# Patient Record
Sex: MALE | Race: WHITE | NOT HISPANIC OR LATINO | Employment: FULL TIME | URBAN - METROPOLITAN AREA
[De-identification: names, ages, dates, MRNs, and addresses within clinical notes are randomized per-mention and may not be internally consistent; named-entity substitution may affect disease eponyms.]

---

## 2018-05-11 ENCOUNTER — OFFICE VISIT (OUTPATIENT)
Dept: MEDICAL GROUP | Facility: MEDICAL CENTER | Age: 33
End: 2018-05-11
Payer: COMMERCIAL

## 2018-05-11 ENCOUNTER — TELEPHONE (OUTPATIENT)
Dept: MEDICAL GROUP | Facility: MEDICAL CENTER | Age: 33
End: 2018-05-11

## 2018-05-11 ENCOUNTER — HOSPITAL ENCOUNTER (OUTPATIENT)
Facility: MEDICAL CENTER | Age: 33
End: 2018-05-11
Attending: FAMILY MEDICINE
Payer: COMMERCIAL

## 2018-05-11 VITALS
BODY MASS INDEX: 26.43 KG/M2 | HEART RATE: 80 BPM | OXYGEN SATURATION: 97 % | TEMPERATURE: 98.1 F | RESPIRATION RATE: 14 BRPM | SYSTOLIC BLOOD PRESSURE: 120 MMHG | HEIGHT: 71 IN | DIASTOLIC BLOOD PRESSURE: 76 MMHG | WEIGHT: 188.8 LBS

## 2018-05-11 DIAGNOSIS — K13.70 ORAL LESION: ICD-10-CM

## 2018-05-11 DIAGNOSIS — Z00.00 HEALTHCARE MAINTENANCE: ICD-10-CM

## 2018-05-11 DIAGNOSIS — Z13.1 SCREENING FOR DIABETES MELLITUS: ICD-10-CM

## 2018-05-11 DIAGNOSIS — Z13.6 SCREENING FOR ISCHEMIC HEART DISEASE: ICD-10-CM

## 2018-05-11 DIAGNOSIS — N50.819 TESTICULAR PAIN: ICD-10-CM

## 2018-05-11 DIAGNOSIS — N50.89 SCROTAL SWELLING: ICD-10-CM

## 2018-05-11 DIAGNOSIS — F43.9 SITUATIONAL STRESS: ICD-10-CM

## 2018-05-11 PROBLEM — N50.9 SCROTAL LESION: Status: RESOLVED | Noted: 2018-05-11 | Resolved: 2018-05-11

## 2018-05-11 PROBLEM — N50.9 SCROTAL LESION: Status: ACTIVE | Noted: 2018-05-11

## 2018-05-11 LAB
APPEARANCE UR: CLEAR
C TRACH DNA SPEC QL NAA+PROBE: NEGATIVE
COLOR UR: NORMAL
GLUCOSE UR STRIP.AUTO-MCNC: NEGATIVE MG/DL
KETONES UR STRIP.AUTO-MCNC: NEGATIVE MG/DL
LEUKOCYTE ESTERASE UR QL STRIP.AUTO: NEGATIVE
MICRO URNS: NORMAL
N GONORRHOEA DNA SPEC QL NAA+PROBE: NEGATIVE
NITRITE UR QL STRIP.AUTO: NEGATIVE
PH UR STRIP.AUTO: 8 [PH]
PROT UR QL STRIP: NEGATIVE MG/DL
RBC UR QL AUTO: NEGATIVE
SP GR UR STRIP.AUTO: 1
SPECIMEN SOURCE: NORMAL

## 2018-05-11 PROCEDURE — 81003 URINALYSIS AUTO W/O SCOPE: CPT

## 2018-05-11 PROCEDURE — 99203 OFFICE O/P NEW LOW 30 MIN: CPT | Performed by: FAMILY MEDICINE

## 2018-05-11 PROCEDURE — 87491 CHLMYD TRACH DNA AMP PROBE: CPT

## 2018-05-11 PROCEDURE — 87591 N.GONORRHOEAE DNA AMP PROB: CPT

## 2018-05-11 ASSESSMENT — PATIENT HEALTH QUESTIONNAIRE - PHQ9: CLINICAL INTERPRETATION OF PHQ2 SCORE: 0

## 2018-05-11 NOTE — TELEPHONE ENCOUNTER
We received labs from [facility] today. The patient also had a scrotal ultrasound. Can we request this specifically?

## 2018-05-11 NOTE — ASSESSMENT & PLAN NOTE
"On 3/3 patient developed some left lower quadrant abdominal pain and bloating. He was prescribed a course of Cipro and Flagyl. This resolved the symptoms, however, sometime around this period he developed intense bilateral testicular pain with a sense that his testicles are moving proximally. He was continued on a few more days of cipro and ultimately had a testicular ultrasound at [facility], [place] which is reportedly normal. Today, the patient states sometimes he feels as if there is some scrotal swelling described as \"tightness\" and prominence of a left scrotal vein. He denies penile discharge, dysuria, urinary frequency and urinary urgency. He does note some possible inguinal discomfort.  "

## 2018-05-11 NOTE — ASSESSMENT & PLAN NOTE
On 4/22 the patient went to his dentist. His hygenist identified some bilateral oral lesions. These were evaluated by his dentist who was not too concerned. The patient wonders if he has some submandibular swelling. He denies sore throat, chest pain, shortness of breath, cough. He does notice some possible mucous production, however.

## 2018-05-11 NOTE — PROGRESS NOTES
"[facility]  Progress Note  New Patient    Subjective:   Fawad Cesar is a 32 y.o. male here today with a chief complaint of oral lesions. This is a new patient to me. The patient comes in alone.     Oral lesion  On 4/22 the patient went to his dentist. His hygenist identified some bilateral oral lesions. These were evaluated by his dentist who was not too concerned. The patient wonders if he has some submandibular swelling. He denies sore throat, chest pain, shortness of breath, cough. He does notice some possible mucous production, however.    Situational stress  The patient is currently going through a divorce. This puts stress on him. He is not currently interested in seeing a counselor.    Scrotal swelling  On 3/3 patient developed some left lower quadrant abdominal pain and bloating. He was prescribed a course of Cipro and Flagyl. This resolved the symptoms, however, sometime around this period he developed intense bilateral testicular pain with a sense that his testicles are moving proximally. He was continued on a few more days of cipro and ultimately had a testicular ultrasound at [facility] which is reportedly normal. Today, the patient states sometimes he feels as if there is some scrotal swelling described as \"tightness\" and prominence of a left scrotal vein. He denies penile discharge, dysuria, urinary frequency and urinary urgency. He does note some possible inguinal discomfort.    Healthcare maintenance  Lipids: ordered.  Fasting Glucose: ordered.      Tdap: given 2009.       No current outpatient prescriptions on file prior to visit.     No current facility-administered medications on file prior to visit.        History reviewed. No pertinent past medical history.    Allergies: Patient has no known allergies.    Surgical History:  has a past surgical history that includes tonsillectomy.    Family History: family history includes Alcohol/Drug in his father; Heart Disease in his father; " "Hypertension in his father.    Social History:  reports that he has been smoking Cigars.  He has never used smokeless tobacco. He reports that he drinks alcohol.    ROS:   See HPI.       Objective:     Vitals:    05/11/18 0824   BP: 120/76   Pulse: 80   Resp: 14   Temp: 36.7 °C (98.1 °F)   SpO2: 97%   Weight: 85.6 kg (188 lb 12.8 oz)   Height: 1.803 m (5' 11\")       Physical Exam:  Constitutional: Alert, no distress.  Skin: Warm, dry, good turgor, no rashes in visible areas.  Eye: Equal, conjunctiva clear, lids normal.  ENMT: Lips without lesions, good dentition, oropharynx clear without redness. Uvula midline. There is some flesh-colored hyperplastic tissue located bilaterally between the palatoglossal and palatopharyngeal arch. The right-sided tissue measures approximately 1 cm in diameter. The left-sided tissue measures approximately 0.75 cm in diameter.  Neck: Trachea midline, no masses, no thyromegaly. No cervical or supraclavicular lymphadenopathy. Normal submandibular glands.  Respiratory: Unlabored respiratory effort, lungs clear to auscultation, no wheezes, no ronchi.  Cardiovascular: Normal S1, S2, no murmur, no edema.  Abdomen: Soft, non-tender, no masses, no hepatosplenomegaly.   : patient declines.  Psych: Alert and oriented.      Assessment and Plan:     1. Oral lesion  The area in question is most consistent with residual tonsillar tissue with likely lymphoid hyperplasia. I see no indication for throat culture. Differential diagnosis includes pemphigus vulgaris (unlikely because there is no pain), lichen planus and cancer. I'm less inclined to think this is cancer because of the bilateral nature and low risk patient. Because the patient is concerned, I did offer an ENT referral for possible biopsy but he declined and would like to watch it which I think is reasonable.  - continue to monitor.     2. Scrotal swelling  Patient declines examination. We'll proceed with workup below and request records " from [facility] for Scrotal ultrasound. I am reassured at the reportedly normal scrotal ultrasound.  - URINALYSIS,CULTURE IF INDICATED; Future  - CHLAMYDIA/GC PCR URINE OR SWAB; Future    3. Situational stress  Offered counseling, patient declines.  - Recommended exercises and speaking with supportive persons.    4. Healthcare maintenance  - see HPI.    5. Screening for ischemic heart disease  - LIPID PROFILE; Future    6. Screening for diabetes mellitus  - BLOOD GLUCOSE; Future    Total 35 minutes face-to-face time spent with patient, with greater than 50% of the total time discussing patient's issues and symptoms as listed above in assessment and plan, as well as managing coordination of care for future evaluation and treatment.    Followup: Return in about 2 weeks (around 5/25/2018), or if symptoms worsen or fail to improve.

## 2018-05-11 NOTE — ASSESSMENT & PLAN NOTE
The patient is currently going through a divorce. This puts stress on him. He is not currently interested in seeing a counselor.

## 2018-05-14 ENCOUNTER — TELEPHONE (OUTPATIENT)
Dept: MEDICAL GROUP | Facility: MEDICAL CENTER | Age: 33
End: 2018-05-14

## 2018-05-14 NOTE — TELEPHONE ENCOUNTER
----- Message from Casper Ackerman M.D. sent at 5/14/2018  7:56 AM PDT -----  Please call and inform this patient of the following:  All of his urine testing is normal.

## 2018-05-29 ENCOUNTER — APPOINTMENT (OUTPATIENT)
Dept: LAB | Facility: MEDICAL CENTER | Age: 33
End: 2018-05-29
Payer: COMMERCIAL

## 2018-05-30 ENCOUNTER — HOSPITAL ENCOUNTER (OUTPATIENT)
Dept: LAB | Facility: MEDICAL CENTER | Age: 33
End: 2018-05-30
Attending: FAMILY MEDICINE
Payer: COMMERCIAL

## 2018-05-30 DIAGNOSIS — Z13.1 SCREENING FOR DIABETES MELLITUS: ICD-10-CM

## 2018-05-30 DIAGNOSIS — Z13.6 SCREENING FOR ISCHEMIC HEART DISEASE: ICD-10-CM

## 2018-05-30 PROCEDURE — 82947 ASSAY GLUCOSE BLOOD QUANT: CPT

## 2018-05-30 PROCEDURE — 80061 LIPID PANEL: CPT

## 2018-05-30 PROCEDURE — 36415 COLL VENOUS BLD VENIPUNCTURE: CPT

## 2018-05-31 ENCOUNTER — OFFICE VISIT (OUTPATIENT)
Dept: MEDICAL GROUP | Facility: MEDICAL CENTER | Age: 33
End: 2018-05-31
Payer: COMMERCIAL

## 2018-05-31 VITALS
HEIGHT: 71 IN | HEART RATE: 74 BPM | WEIGHT: 188 LBS | RESPIRATION RATE: 16 BRPM | BODY MASS INDEX: 26.32 KG/M2 | SYSTOLIC BLOOD PRESSURE: 100 MMHG | OXYGEN SATURATION: 94 % | TEMPERATURE: 98.5 F | DIASTOLIC BLOOD PRESSURE: 60 MMHG

## 2018-05-31 DIAGNOSIS — K60.2 ANAL FISSURE: ICD-10-CM

## 2018-05-31 DIAGNOSIS — N50.89 SCROTAL SWELLING: ICD-10-CM

## 2018-05-31 DIAGNOSIS — F43.9 SITUATIONAL STRESS: ICD-10-CM

## 2018-05-31 DIAGNOSIS — Z00.00 HEALTHCARE MAINTENANCE: ICD-10-CM

## 2018-05-31 DIAGNOSIS — K13.70 ORAL LESION: ICD-10-CM

## 2018-05-31 LAB
CHOLEST SERPL-MCNC: 123 MG/DL (ref 100–199)
GLUCOSE SERPL-MCNC: 80 MG/DL (ref 65–99)
HDLC SERPL-MCNC: 44 MG/DL
LDLC SERPL CALC-MCNC: 62 MG/DL
TRIGL SERPL-MCNC: 85 MG/DL (ref 0–149)

## 2018-05-31 PROCEDURE — 99214 OFFICE O/P EST MOD 30 MIN: CPT | Performed by: FAMILY MEDICINE

## 2018-05-31 RX ORDER — IBUPROFEN 200 MG
200 TABLET ORAL EVERY 6 HOURS PRN
COMMUNITY

## 2018-05-31 NOTE — PROGRESS NOTES
"[facility]  Progress Note  Established Patient    Subjective:   Fawad Cesar is a 32 y.o. male here today with a chief complaint of rectal bleeding.     Anal fissure  The patient has a history of hemorrhoids. One month ago, however, he noticed bleeding and pain with defecation for one week. This got better and then got worse again. He had a bowel movement this morning which was slightly painful. Overall he thinks he is slightly improving.    Oral lesion  At the last visit the patient described some oral lesions without sore throat, chest pain, shortness of breath, cough. He has monitored these and they have remained stable without any change. He is also followed up with his dentist.     Scrotal swelling  At the last visit the patient described some left lower quadrant abdominal pain and bloating responsive to Cipro and Flagyl with bilateral testicular pain with a sense that his testicles are moving proximally. He states that he had a normal testicular ultrasound at [facility]. At the last visit he also described some scrotal swelling described as \"tightness\" and prominence of a left scrotal vein without penile discharge, dysuria, urinary frequency and urinary urgency. A UA and GC/Chl test were obtained and were normal. Patient states that all of these symptoms have resolved.    Situational stress  The patient is currently going through a divorce. This puts stress on him. He continues to decline seeing a counselor or taking medication. He states he is overall doing better.    Healthcare maintenance  Lipids: done 2018 and normal.   Fasting Glucose: done 2018 and normal.     Tdap: done 2009.       No current outpatient prescriptions on file prior to visit.     No current facility-administered medications on file prior to visit.        History reviewed. No pertinent past medical history.    Allergies: Patient has no known allergies.    Surgical History:  has a past surgical history that " "includes tonsillectomy.    Family History: family history includes Alcohol/Drug in his father; Heart Disease in his father; Hypertension in his father.    Social History:  reports that he has been smoking Cigars.  He has never used smokeless tobacco. He reports that he drinks alcohol.    ROS: no fever. See HPI.        Objective:     Vitals:    05/31/18 1501   BP: 100/60   Pulse: 74   Resp: 16   Temp: 36.9 °C (98.5 °F)   SpO2: 94%   Weight: 85.3 kg (188 lb)   Height: 1.803 m (5' 11\")       Physical Exam:  General: alert in no apparent distress.   Rectal: at the posterior midline there is a clear anal fissure.   ENMT: There is some flesh-colored hyperplastic tissue located bilaterally between the palatoglossal and palatopharyngeal arch. The right-sided tissue measures approximately 1 cm in diameter. The left-sided tissue measures approximately 0.75 cm in diameter. No cervical or supraclavicular LAD.       Assessment and Plan:     1. Anal fissure  I informed patient I strongly recommend he increase his fluid and fiber intake.   - Recommended 2-3 L of water per day and 4 raw vegetables and 3 raw fruits per day.   - I offered him prescription medication including a nitroglycerin but he declines.  - metamucil.   - call if persistent or worsening.     2. Oral lesion  There have been no change in these lesions. Again, I think these are some residual tonsillar tissue with lymphoid hyperplasia; however, I did inform the patient that I cannot definitively rule out cancer without a biopsy. I offered to send to ENT to achieve this. He is not currently interested.  - patient will monitor and inform me if they change or increase in size.     3. Scrotal swelling  - resolved.     4. Situational stress  - declines medication or counseling.     5. Healthcare maintenance  - see HPI.         Followup: Return in about 6 months (around 11/30/2018), or if symptoms worsen or fail to improve, for Wellness Visit, Long.         "

## 2018-05-31 NOTE — ASSESSMENT & PLAN NOTE
"At the last visit the patient described some left lower quadrant abdominal pain and bloating responsive to Cipro and Flagyl with bilateral testicular pain with a sense that his testicles are moving proximally. He states that he had a normal testicular ultrasound at [facility]. At the last visit he also described some scrotal swelling described as \"tightness\" and prominence of a left scrotal vein without penile discharge, dysuria, urinary frequency and urinary urgency. A UA and GC/Chl test were obtained and were normal. Patient states that all of these symptoms have resolved.  "

## 2018-05-31 NOTE — ASSESSMENT & PLAN NOTE
At the last visit the patient described some oral lesions without sore throat, chest pain, shortness of breath, cough. He has monitored these and they have remained stable without any change. He is also followed up with his dentist.

## 2018-05-31 NOTE — ASSESSMENT & PLAN NOTE
The patient is currently going through a divorce. This puts stress on him. He continues to decline seeing a counselor or taking medication. He states he is overall doing better.

## 2018-05-31 NOTE — ASSESSMENT & PLAN NOTE
The patient has a history of hemorrhoids. One month ago, however, he noticed bleeding and pain with defecation for one week. This got better and then got worse again. He had a bowel movement this morning which was slightly painful. Overall he thinks he is slightly improving.

## 2018-06-28 ENCOUNTER — TELEPHONE (OUTPATIENT)
Dept: MEDICAL GROUP | Facility: MEDICAL CENTER | Age: 33
End: 2018-06-28

## 2018-06-28 DIAGNOSIS — J39.2 OROPHARYNGEAL LESION: ICD-10-CM

## 2018-06-29 NOTE — TELEPHONE ENCOUNTER
The patient contacted me. He believes the number of oropharyngeal lesions have increased and is interested in seeing an ENT. Consultation placed.

## 2019-03-29 DIAGNOSIS — R79.89 ELEVATED TSH: ICD-10-CM

## 2020-12-16 ENCOUNTER — HOSPITAL ENCOUNTER (OUTPATIENT)
Facility: MEDICAL CENTER | Age: 35
End: 2020-12-16
Payer: COMMERCIAL

## 2020-12-16 LAB
COVID ORDER STATUS COVID19: NORMAL
SARS-COV-2 RNA RESP QL NAA+PROBE: NOTDETECTED
SPECIMEN SOURCE: NORMAL